# Patient Record
Sex: FEMALE | Race: WHITE | NOT HISPANIC OR LATINO | Employment: STUDENT | URBAN - NONMETROPOLITAN AREA
[De-identification: names, ages, dates, MRNs, and addresses within clinical notes are randomized per-mention and may not be internally consistent; named-entity substitution may affect disease eponyms.]

---

## 2023-07-12 PROBLEM — F41.8 SITUATIONAL ANXIETY: Status: ACTIVE | Noted: 2023-07-12

## 2023-07-12 PROBLEM — K44.9 HIATAL HERNIA: Status: ACTIVE | Noted: 2023-07-12

## 2023-07-12 PROBLEM — R68.89 COLD INTOLERANCE: Status: ACTIVE | Noted: 2023-07-12

## 2023-07-12 PROBLEM — L63.9 ALOPECIA AREATA: Status: ACTIVE | Noted: 2023-07-12

## 2023-07-12 RX ORDER — PANTOPRAZOLE SODIUM 20 MG/1
20 TABLET, DELAYED RELEASE ORAL
COMMUNITY

## 2023-07-12 RX ORDER — SUCRALFATE 1 G/1
1 TABLET ORAL
COMMUNITY

## 2023-07-12 RX ORDER — HYDROXYZINE HYDROCHLORIDE 25 MG/1
25 TABLET, FILM COATED ORAL
COMMUNITY
Start: 2022-05-19 | End: 2023-07-13 | Stop reason: WASHOUT

## 2023-07-12 NOTE — PROGRESS NOTES
"Subjective   Patient ID: Jazlyn Wahl is a 22 y.o. female who presents for Annual Exam (ABN given to pt and signed, pt verbalized understanding.).    HPI     TDAP: 5/2020  GARDASIL: completed  PAP: 6/2021 - managed by GYN, Dr. Tami Fuentes  MAMMO: N/A  CSCOPE: N/A  DEXA: N/A  HEP C SCREEN: none found  LIPID: 5/2022 TC/HDL ratio 2.5    Diet: balanced, overall healthy    Exercise: 3-4 times per week, lifts mostly but tries to walk every day, not doing any ROM or yoga.    Alcohol use:    Smoking: non-smoker    Dental visits up to date/overdue.  Vision screening up to date/overdue.    Breast cancer screening: Denies family history.   Denies lumps/bumps, skin changes, nipple retraction, or nipple drainage.    Cervical cancer screening: Managed by [facility]  Denies family history.   Denies pelvic pain, vaginal discharge, or vaginal bleeding.  Stopped OCP after getting off the Accutane.  Has seen GYN WITH abnormal pap smears in past.  Patient is sexually active, male partners, does not always use condoms.    Colon cancer screening: Denies family history.   Denies melena, hematochezia, constipation, diarrhea, bloating, change in bowel habits.    Cardiac disorder screening: Denies family history.  Denies chest pain, SOB, palpitations, edema, dizziness.     ACNE  Saw derm, [facility]  States got off Accutane just recently, took for 5 months.  Never had acne, developed only on back after wearing cotton shirts under outer shirts.    MOOD  Not using the hydroxyzine  States tried twice but could not tolerate due to sedation/hangover effect.    HIATAL HERNIA  Taking Protonix 20 mg daily.      Review of Systems   All other systems reviewed and are negative.      Objective   BP 96/61 (BP Location: Right arm, Patient Position: Sitting, BP Cuff Size: Small adult)   Pulse 82   Temp 36.6 °C (97.9 °F) (Temporal)   Resp 14   Ht 1.676 m (5' 6\")   Wt 70.3 kg (155 lb)   LMP 06/29/2023 (Approximate)   SpO2 95%   BMI 25.02 kg/m² "     Physical Exam  Vitals and nursing note reviewed.   Constitutional:       General: She is not in acute distress.     Appearance: Normal appearance. She is not toxic-appearing.   HENT:      Head: Normocephalic and atraumatic.      Right Ear: Tympanic membrane normal.      Left Ear: Tympanic membrane normal.      Nose: Nose normal.      Mouth/Throat:      Mouth: Mucous membranes are moist.   Eyes:      Extraocular Movements: Extraocular movements intact.      Pupils: Pupils are equal, round, and reactive to light.   Neck:      Thyroid: No thyromegaly.   Cardiovascular:      Rate and Rhythm: Normal rate and regular rhythm.      Heart sounds: No murmur heard.     No friction rub. No gallop.   Pulmonary:      Effort: Pulmonary effort is normal.      Breath sounds: Normal breath sounds. No wheezing, rhonchi or rales.   Abdominal:      General: Bowel sounds are normal. There is no distension.      Palpations: Abdomen is soft. There is no mass.      Tenderness: There is no abdominal tenderness. There is no guarding.   Musculoskeletal:      Right lower leg: No edema.      Left lower leg: No edema.   Lymphadenopathy:      Cervical: No cervical adenopathy.   Skin:     General: Skin is warm and dry.   Neurological:      General: No focal deficit present.      Mental Status: She is alert and oriented to person, place, and time.      Gait: Gait normal.      Deep Tendon Reflexes: Reflexes normal.   Psychiatric:         Mood and Affect: Mood normal.         Behavior: Behavior normal.         Assessment/Plan   Problem List Items Addressed This Visit       Hiatal hernia     Stable, continue Protonix 20 mg daily         Situational anxiety     Unable to tolerate hydroxyzine secondary to hangover effect  Managing well on her own at this time.  Can further address in the future if needed.         Healthcare maintenance - Primary     Vaccines and screenings reviewed, all are UTD.  Questionnaires completed.  Health and wellness topics  reviewed.  Diet and exercise recommendations revisited.  Routine blood work ordered today, deferred TSH as reports recently done with derm and WNL.  Paps managed by GYN, sees next month.  STI blood screening test deferred today, plans to further discuss with GYN.    Recommend vitamin D 9536-3520 IU daily.  Recommend some sort of exercise 5-6 times per week, 60 minutes those days.  Recommendation is visits with the dentist twice a year.   Make sure to brush teeth twice daily, and floss once a day.  Always wear a seatbelt when riding in cars, and tell the  no texting and driving.  Always wear sunscreen when you have sun exposure.  Annual eye exams are recommended.  Recommendation is no smoking of cigarettes, marijuana, or vaping.          Relevant Orders    CBC    Comprehensive Metabolic Panel    Lipid Panel    Acne     Developed acne to back, improved with 5-6 months of Accutane.  Doing well off the Accutane at this time.  Follow-up with derm as they have recommended.          Other Visit Diagnoses       Overweight with body mass index (BMI) of 25 to 25.9 in adult              Follow-up in 1 year for annual physical.   Call for sooner follow-up if needed.     Scribe Attestation  By signing my name below, ICharlene, Darnell   attest that this documentation has been prepared under the direction and in the presence of Genesis George DO.

## 2023-07-13 ENCOUNTER — OFFICE VISIT (OUTPATIENT)
Dept: PRIMARY CARE | Facility: CLINIC | Age: 23
End: 2023-07-13
Payer: COMMERCIAL

## 2023-07-13 VITALS
BODY MASS INDEX: 24.91 KG/M2 | HEART RATE: 82 BPM | WEIGHT: 155 LBS | TEMPERATURE: 97.9 F | OXYGEN SATURATION: 95 % | RESPIRATION RATE: 14 BRPM | SYSTOLIC BLOOD PRESSURE: 96 MMHG | DIASTOLIC BLOOD PRESSURE: 61 MMHG | HEIGHT: 66 IN

## 2023-07-13 DIAGNOSIS — L70.9 ACNE, UNSPECIFIED ACNE TYPE: ICD-10-CM

## 2023-07-13 DIAGNOSIS — K44.9 HIATAL HERNIA: ICD-10-CM

## 2023-07-13 DIAGNOSIS — E66.3 OVERWEIGHT WITH BODY MASS INDEX (BMI) OF 25 TO 25.9 IN ADULT: ICD-10-CM

## 2023-07-13 DIAGNOSIS — F41.8 SITUATIONAL ANXIETY: ICD-10-CM

## 2023-07-13 DIAGNOSIS — Z00.00 HEALTHCARE MAINTENANCE: Primary | ICD-10-CM

## 2023-07-13 PROBLEM — R68.89 COLD INTOLERANCE: Status: RESOLVED | Noted: 2023-07-12 | Resolved: 2023-07-13

## 2023-07-13 PROCEDURE — 1036F TOBACCO NON-USER: CPT | Performed by: FAMILY MEDICINE

## 2023-07-13 PROCEDURE — 99395 PREV VISIT EST AGE 18-39: CPT | Performed by: FAMILY MEDICINE

## 2023-07-13 PROCEDURE — 3008F BODY MASS INDEX DOCD: CPT | Performed by: FAMILY MEDICINE

## 2023-07-13 RX ORDER — DROSPIRENONE, ETHINYL ESTRADIOL AND LEVOMEFOLATE CALCIUM AND LEVOMEFOLATE CALCIUM 3-0.02(24)
1 KIT ORAL DAILY
COMMUNITY
Start: 2021-01-25

## 2023-07-13 NOTE — ASSESSMENT & PLAN NOTE
Unable to tolerate hydroxyzine secondary to hangover effect  Managing well on her own at this time.  Can further address in the future if needed.

## 2023-07-13 NOTE — ASSESSMENT & PLAN NOTE
Developed acne to back, improved with 5-6 months of Accutane.  Doing well off the Accutane at this time.  Follow-up with derm as they have recommended.

## 2023-07-13 NOTE — ASSESSMENT & PLAN NOTE
Vaccines and screenings reviewed, all are UTD.  Questionnaires completed.  Health and wellness topics reviewed.  Diet and exercise recommendations revisited.  Routine blood work ordered today, deferred TSH as reports recently done with derm and WNL.  Paps managed by GYN, sees next month.  STI blood screening test deferred today, plans to further discuss with GYN.    Recommend vitamin D 7827-9436 IU daily.  Recommend some sort of exercise 5-6 times per week, 60 minutes those days.  Recommendation is visits with the dentist twice a year.   Make sure to brush teeth twice daily, and floss once a day.  Always wear a seatbelt when riding in cars, and tell the  no texting and driving.  Always wear sunscreen when you have sun exposure.  Annual eye exams are recommended.  Recommendation is no smoking of cigarettes, marijuana, or vaping.

## 2023-09-29 ENCOUNTER — TELEPHONE (OUTPATIENT)
Dept: PRIMARY CARE | Facility: CLINIC | Age: 23
End: 2023-09-29
Payer: COMMERCIAL

## 2023-09-29 DIAGNOSIS — M25.531 CHRONIC WRIST PAIN, RIGHT: Primary | ICD-10-CM

## 2023-09-29 DIAGNOSIS — G89.29 CHRONIC WRIST PAIN, RIGHT: Primary | ICD-10-CM

## 2023-09-29 NOTE — TELEPHONE ENCOUNTER
Pt called for a referral for Occupational Therapy. Pt had a referral placed last year and it . Can you place a new referral. Pt would like to go next door. Once referral is placed pt would like a cb at [phone].

## 2023-10-02 NOTE — TELEPHONE ENCOUNTER
Right wrist pain last dec wants new ot order    no abdominal pain, no bloating, no constipation, no diarrhea, no nausea and no vomiting.

## 2023-10-10 ENCOUNTER — EVALUATION (OUTPATIENT)
Dept: OCCUPATIONAL THERAPY | Facility: CLINIC | Age: 23
End: 2023-10-10
Payer: COMMERCIAL

## 2023-10-10 DIAGNOSIS — G89.29 WRIST PAIN, CHRONIC, LEFT: Primary | ICD-10-CM

## 2023-10-10 DIAGNOSIS — M25.531 CHRONIC WRIST PAIN, RIGHT: ICD-10-CM

## 2023-10-10 DIAGNOSIS — G89.29 CHRONIC WRIST PAIN, RIGHT: ICD-10-CM

## 2023-10-10 DIAGNOSIS — M25.532 WRIST PAIN, CHRONIC, LEFT: Primary | ICD-10-CM

## 2023-10-10 PROCEDURE — 97165 OT EVAL LOW COMPLEX 30 MIN: CPT | Mod: GO

## 2023-10-10 PROCEDURE — 97110 THERAPEUTIC EXERCISES: CPT | Mod: GO

## 2023-10-10 PROCEDURE — 97035 APP MDLTY 1+ULTRASOUND EA 15: CPT | Mod: GO

## 2023-10-10 ASSESSMENT — ENCOUNTER SYMPTOMS
LOSS OF SENSATION IN FEET: 0
DEPRESSION: 0
OCCASIONAL FEELINGS OF UNSTEADINESS: 0

## 2023-10-10 ASSESSMENT — PAIN SCALES - GENERAL: PAINLEVEL_OUTOF10: 4

## 2023-10-10 ASSESSMENT — PAIN DESCRIPTION - DESCRIPTORS: DESCRIPTORS: ACHING;SHARP;SHOOTING

## 2023-10-10 ASSESSMENT — PAIN - FUNCTIONAL ASSESSMENT: PAIN_FUNCTIONAL_ASSESSMENT: 0-10

## 2023-10-10 NOTE — LETTER
October 10, 2023     Patient: Jazlyn Wahl   YOB: 2000   Date of Visit: 10/10/2023       To Whom it May Concern:    Jazlyn Wahl was seen in my clinic on 10/10/2023. She {Return to school/sport:08231}.    If you have any questions or concerns, please don't hesitate to call.         Sincerely,          Dave Scott, OT        CC: No Recipients

## 2023-10-10 NOTE — LETTER
October 10, 2023     Patient: Jazlyn Wahl   YOB: 2000   Date of Visit: 10/10/2023       To Whom It May Concern:    It is my medical opinion that Jazlyn Wahl {Work release (duty restriction):16533}.    If you have any questions or concerns, please don't hesitate to call.         Sincerely,        Dave Scott, OT    CC: No Recipients

## 2023-10-10 NOTE — PROGRESS NOTES
Occupational Therapy Evaluation    Patient Name: Jazlyn Wahl  MRN: [id number]  Today's Date: 10/10/2023  Time Calculation  Start Time: 1230  Stop Time: 1315  Time Calculation (min): 45 min    Subjective   Current Problem:  Pt been having pain ulnar two digits dorsal side.  However, after CPR class it just got way worse.  Pain:  Pain Assessment  Pain Assessment: 0-10  Pain Score: 4  Pain Type: Chronic pain  Pain Location: Hand  Pain Orientation: Left  Pain Radiating Towards: dorsal ulnar two digits  Pain Descriptors: Aching, Sharp, Shooting  Pain Frequency: Constant/continuous  Clinical Progression: Not changed  Effect of Pain on Daily Activities: Work, and gym    Objective   Pt reports pain with her job as a  and when working out at gym.  General Assessments:  Hand Function  Gross Grasp: Functional (R  64#  L  70#)  Extremity Assessments:  RUE   RUE : Exceptions to WFL  RUE AROM (degrees)  R Forearm Pronation  0-80-90:  (WNL)  R Forearm Supination  0-80-90:  (WNL)  R Wrist Flexion 0-80: 65 Degrees  R Wrist Extension 0-70: 70 Degrees  R Wrist Radial Deviation 0-20: 25 Degrees  R Wrist Ulnar Deviation 0-30: 35 Degrees  RUE Strength  RUE Overall Strength: Within Functional Limits - strength 5/5  R Wrist Flexion: 4+/5  R Wrist Extension: 4+/5  R Wrist Radial Deviation: 5/5  R Wrist Ulnar Deviation: 5/5    TREATMENT  Therapeutic Exercise  Therapeutic Exercise Performed: Yes  Therapeutic Exercise Activity 1: Initiated HEP (Wrist isometrics including ext/flex, RD/UD x 10 reps)  Therapeutic Exercise Activity 2: Kinesiotape (Applied kinesiotape mechanically for wrist and space correction over dorsal wrist.) Therapeutic Exercise  Therapeutic Exercise Performed: Yes  Therapeutic Exercise Activity 1: Initiated HEP (Wrist isometrics including ext/flex, RD/UD x 10 reps)  Therapeutic Exercise Activity 2: Kinesiotape (Applied kinesiotape mechanically for wrist and space correction over dorsal wrist.)   Other  Activity  Other Activity Performed: Yes  Other Activity 1: Ultrasound 0.8 w/cm2, 3 mhz, 50% to dorsal ulnar two digits   Assessment/Plan   OT Assessment  OT Assessment Results: Decreased endurance, Other (Comment) (pain is most prominant issue)  Strengths: Attitude of self, Ability to acquire knowledge  Plan  OT Plan: TE, TA, US, Manual  Duration: 6 weeks    Encounter Problems       Encounter Problems (Active)       OT Goals       Pt will report pain on ulnar side dorsally less than 2/10 with work duties and gym workout.       Start:  10/10/23    Expected End:  11/21/23            Pt will report less constant pain 1/10 with ADLS/IADLS.       Start:  10/10/23    Expected End:  11/21/23            Pt will be independent with HEP carryover in order to reduce pain.       Start:  10/10/23    Expected End:  11/21/23

## 2023-10-17 ENCOUNTER — TREATMENT (OUTPATIENT)
Dept: OCCUPATIONAL THERAPY | Facility: CLINIC | Age: 23
End: 2023-10-17
Payer: COMMERCIAL

## 2023-10-17 DIAGNOSIS — M25.531 CHRONIC WRIST PAIN, RIGHT: ICD-10-CM

## 2023-10-17 DIAGNOSIS — G89.29 CHRONIC WRIST PAIN, RIGHT: ICD-10-CM

## 2023-10-17 PROCEDURE — 97035 APP MDLTY 1+ULTRASOUND EA 15: CPT | Mod: GO

## 2023-10-17 PROCEDURE — 97110 THERAPEUTIC EXERCISES: CPT | Mod: GO

## 2023-10-17 PROCEDURE — 97022 WHIRLPOOL THERAPY: CPT | Mod: GO

## 2023-10-17 ASSESSMENT — PAIN SCALES - GENERAL: PAINLEVEL_OUTOF10: 4

## 2023-10-17 ASSESSMENT — PAIN - FUNCTIONAL ASSESSMENT: PAIN_FUNCTIONAL_ASSESSMENT: 0-10

## 2023-10-17 NOTE — PROGRESS NOTES
Occupational Therapy Treatment    Patient Name: Jazlyn Wahl  MRN: [id number]  Today's Date: 10/17/2023  Time Calculation  Start Time: 1315  Stop Time: 1415  Time Calculation (min): 60 min    Subjective   Current Problem:  Pt states she had a lot of pain after last treatment.  She had to take the tape off the next day.   Pain:  Pain Assessment  Pain Assessment: 0-10  Pain Score: 4  Pain Location: Hand  Pain Orientation: Left    Objective        Treatment:  Therapeutic Exercise  Therapeutic Exercise Performed: Yes  Therapeutic Exercise Activity 1: HEP (tendon glides)  Therapeutic Exercise Activity 2: HEP (wrist ROM ext/flex, RD/UD in a safe position)         Other Activity  Other Activity Performed: Yes  Other Activity 1: Fluidotherapy with AROM of R hand  Other Activity 2: Ultrasound 0.8 w/cm2, 50% to dorsum of hand and ulnar volar side of hand (6/4 minutes each)  Other Activity 3: Issued prefab wrist wrap (Wear/care instructions provided.)    Assessment/Plan Pt continues to have pain in her hand dorsal and volar aspect. All TE and education provided to help reduce the pain.     Plan  OT Plan: TE, TA, US, ManualGOALS:  Active       OT Goals       Pt will report pain on ulnar side dorsally less than 2/10 with work duties and gym workout.       Start:  10/10/23    Expected End:  11/21/23            Pt will report less constant pain 1/10 with ADLS/IADLS.       Start:  10/10/23    Expected End:  11/21/23            Pt will be independent with HEP carryover in order to reduce pain.       Start:  10/10/23    Expected End:  11/21/23

## 2023-10-24 ENCOUNTER — TREATMENT (OUTPATIENT)
Dept: OCCUPATIONAL THERAPY | Facility: CLINIC | Age: 23
End: 2023-10-24
Payer: COMMERCIAL

## 2023-10-24 DIAGNOSIS — G89.29 CHRONIC WRIST PAIN, RIGHT: ICD-10-CM

## 2023-10-24 DIAGNOSIS — M25.531 CHRONIC WRIST PAIN, RIGHT: ICD-10-CM

## 2023-10-24 PROCEDURE — 97022 WHIRLPOOL THERAPY: CPT | Mod: GO

## 2023-10-24 PROCEDURE — 97140 MANUAL THERAPY 1/> REGIONS: CPT | Mod: GO

## 2023-10-24 PROCEDURE — 97110 THERAPEUTIC EXERCISES: CPT | Mod: GO

## 2023-10-24 PROCEDURE — 97035 APP MDLTY 1+ULTRASOUND EA 15: CPT | Mod: GO

## 2023-10-24 ASSESSMENT — PAIN - FUNCTIONAL ASSESSMENT: PAIN_FUNCTIONAL_ASSESSMENT: 0-10

## 2023-10-24 ASSESSMENT — PAIN SCALES - GENERAL: PAINLEVEL_OUTOF10: 1

## 2023-10-24 NOTE — PROGRESS NOTES
"OOccupational Therapy Treatment    Patient Name: Jazlyn Wahl  MRN: [id number]  Today's Date: 10/24/2023  Time Calculation  Start Time: 1700  Stop Time: 1750  Time Calculation (min): 50 min    Subjective   Current Problem:  Pt arrives stating she literally has no pain and cannot believe it. \"I haven't been pain free for 2 years.\"  Pain:  Pain Assessment  Pain Assessment: 0-10  Pain Score: 1  Pain Location: Hand  Pain Orientation: Left    Objective   Treatment:  Therapeutic Exercise  Therapeutic Exercise Performed: Yes  Therapeutic Exercise Activity 1: Upgraded HEP with use of pink sponge doing tendon glides. (Advised pt to go slow with this exercise.)  Therapeutic Exercise Activity 2: Reviewed regular tendon glides that need to continue.  Therapeutic Exercise Activity 3: Encouraged diaphgramatic breathing throughout the day.      Manual Therapy  Manual Therapy Performed: Yes  Manual Therapy Activity 1: STM hand dorsal and volar as well as down wrist and forearm. Retrograde massage as well. (Educated pt on lymph system and how it works with the retrograde massage.)    Other Activity  Other Activity Performed: Yes  Other Activity 1: Fluidotherapy with AROM of R hand  Other Activity 2: Ultrasound 0.8 w/cm2, 50% to dorsum of hand and ulnar volar side of hand (6/4 minutes each)      Assessment/Plan Pt making good progress with reduction in pain. Strongly recommended pt to go slow with use of pink sponge.     Plan  OT Plan: TE, TA, US, Manual    GOALS:  Active       OT Goals       Pt will report pain on ulnar side dorsally less than 2/10 with work duties and gym workout.       Start:  10/10/23    Expected End:  11/21/23            Pt will report less constant pain 1/10 with ADLS/IADLS.       Start:  10/10/23    Expected End:  11/21/23            Pt will be independent with HEP carryover in order to reduce pain.       Start:  10/10/23    Expected End:  11/21/23              "

## 2023-10-31 ENCOUNTER — APPOINTMENT (OUTPATIENT)
Dept: OCCUPATIONAL THERAPY | Facility: CLINIC | Age: 23
End: 2023-10-31
Payer: COMMERCIAL

## 2023-11-07 ENCOUNTER — TREATMENT (OUTPATIENT)
Dept: OCCUPATIONAL THERAPY | Facility: CLINIC | Age: 23
End: 2023-11-07
Payer: COMMERCIAL

## 2023-11-07 DIAGNOSIS — M25.531 CHRONIC WRIST PAIN, RIGHT: ICD-10-CM

## 2023-11-07 DIAGNOSIS — G89.29 CHRONIC WRIST PAIN, RIGHT: ICD-10-CM

## 2023-11-07 PROCEDURE — 97110 THERAPEUTIC EXERCISES: CPT | Mod: GO

## 2023-11-07 PROCEDURE — 97035 APP MDLTY 1+ULTRASOUND EA 15: CPT | Mod: GO

## 2023-11-07 PROCEDURE — 97140 MANUAL THERAPY 1/> REGIONS: CPT | Mod: GO

## 2023-11-07 ASSESSMENT — PAIN SCALES - GENERAL: PAINLEVEL_OUTOF10: 3

## 2023-11-07 ASSESSMENT — PAIN - FUNCTIONAL ASSESSMENT: PAIN_FUNCTIONAL_ASSESSMENT: 0-10

## 2023-11-07 ASSESSMENT — PAIN DESCRIPTION - DESCRIPTORS: DESCRIPTORS: OTHER (COMMENT)

## 2023-11-07 NOTE — PROGRESS NOTES
Occupational Therapy Treatment    Patient Name: Jazlyn Wahl  MRN: [id number]  Today's Date: 11/7/2023  Time Calculation  Start Time: 1325  Stop Time: 1415  Time Calculation (min): 50 min    Subjective   Current Problem:  Pt states she was weight lifting and hurt her shoulder and then doing push ups and hurt her wrist.  All on right side.   Pain:  Pain Assessment  Pain Assessment: 0-10  Pain Score: 3  Pain Location: Hand  Pain Orientation: Left  Pain Descriptors: Other (Comment) (Stiff)  Clinical Progression: Other (Comment) (Pt attempted push up and hurt her wrist and then press up hurt her shoulder)    Objective   Splinting and Casting: Encouraged pt to wear neoprene wrist wrap daily.     Treatment:  Therapeutic Exercise  Therapeutic Exercise Performed: Yes  Therapeutic Exercise Activity 1: Upgraded HEP with wrist ext/flex place holds.  Therapeutic Exercise Activity 2: Upgraded HEP with doorway stretch and pec stretch both to hold 10 seconds and complete 3 reps 3 times per day.       Manual Therapy  Manual Therapy Performed: Yes  Manual Therapy Activity 1: STM hand dorsal and volar as well as down wrist and forearm. Retrograde massage as well.  Manual Therapy Activity 2: IASTM with hawk  on R shoulder anterior, middle and posterior. (Good histamine responses)  Manual Therapy Activity 3: Applied kinesiotape to dorsum of wrist using space correction in a cross pattern to help reduce pain.  Manual Therapy Activity 4: Applied kinesiotape to shoulder using an X to inhibit biceps from elbow to shoulder.    Other Activity  Other Activity Performed: Yes  Other Activity 1: Fluidotherapy with AROM of R hand  Other Activity 2: Ultrasound 0.8 w/cm2, 50% to dorsum of hand and ulnar volar side of hand (6/4 minutes each)      Assessment/Plan Pt responded well to treatment.  Strongly recommended to pt to discontinue weight lifting with upper body for at least a week. Will revisit her pain and injuries next week.  Pt did state  the tape placed in both areas seem to be helping.     Plan  OT Plan: TE, TA, US, Manual    GOALS:  Active       OT Goals       Pt will report pain on ulnar side dorsally less than 2/10 with work duties and gym workout.       Start:  10/10/23    Expected End:  11/21/23            Pt will report less constant pain 1/10 with ADLS/IADLS.       Start:  10/10/23    Expected End:  11/21/23            Pt will be independent with HEP carryover in order to reduce pain.       Start:  10/10/23    Expected End:  11/21/23

## 2023-11-14 ENCOUNTER — TREATMENT (OUTPATIENT)
Dept: OCCUPATIONAL THERAPY | Facility: CLINIC | Age: 23
End: 2023-11-14
Payer: COMMERCIAL

## 2023-11-14 DIAGNOSIS — G89.29 CHRONIC WRIST PAIN, RIGHT: ICD-10-CM

## 2023-11-14 DIAGNOSIS — M25.531 CHRONIC WRIST PAIN, RIGHT: ICD-10-CM

## 2023-11-14 PROCEDURE — 97140 MANUAL THERAPY 1/> REGIONS: CPT | Mod: GO

## 2023-11-14 PROCEDURE — 97035 APP MDLTY 1+ULTRASOUND EA 15: CPT | Mod: GO

## 2023-11-14 PROCEDURE — 97022 WHIRLPOOL THERAPY: CPT | Mod: GO

## 2023-11-14 PROCEDURE — 97110 THERAPEUTIC EXERCISES: CPT | Mod: GO

## 2023-11-14 ASSESSMENT — PAIN SCALES - GENERAL: PAINLEVEL_OUTOF10: 3

## 2023-11-14 ASSESSMENT — PAIN - FUNCTIONAL ASSESSMENT: PAIN_FUNCTIONAL_ASSESSMENT: 0-10

## 2023-11-14 NOTE — PROGRESS NOTES
Occupational Therapy Treatment    Patient Name: Jazlyn Wahl  MRN: [id number]  Today's Date: 11/14/2023  Time Calculation  Start Time: 1105  Stop Time: 1155  Time Calculation (min): 50 min    Subjective   Current Problem:  Pt arrives to clinic stating feels like top of hand is in a bubble.  Also shoulder much better like a 2/10.   Pain:  Pain Assessment  Pain Assessment: 0-10  Pain Score: 3  Pain Location: Hand  Pain Orientation: Left  Clinical Progression: Gradually improving  Effect of Pain on Daily Activities: Pt reports shoulder improving.    Objective      Treatment:  Therapeutic Exercise  Therapeutic Exercise Performed: Yes  Therapeutic Exercise Activity 1: Upgraded HEP with theraputty including full and flat , 3 jaw and lateral pinch and finger extension.  Issued yellow putty.    Manual Therapy  Manual Therapy Performed: Yes  Manual Therapy Activity 1: Applied kinesiotape to dorsum of wrist using space correction in a cross pattern to help reduce pain.    Other Activity  Other Activity Performed: Yes  Other Activity 1: Fluidotherapy with AROM of R hand  Other Activity 2: Ultrasound 3 mHz, 0.8 w/cm2, 50% to dorsum of hand and wrist.      Assessment/Plan Pt is able to work through her pain/tightness in her hand stating it is not bad enough to stop.  Advised pt that if this continues we may need to consider an orthopedic doctor referral.     Plan  OT Plan: TE, TA, US, Manual    GOALS:  Active       OT Goals       Pt will report pain on ulnar side dorsally less than 2/10 with work duties and gym workout.       Start:  10/10/23    Expected End:  11/21/23            Pt will report less constant pain 1/10 with ADLS/IADLS.       Start:  10/10/23    Expected End:  11/21/23            Pt will be independent with HEP carryover in order to reduce pain.       Start:  10/10/23    Expected End:  11/21/23

## 2023-11-21 ENCOUNTER — TREATMENT (OUTPATIENT)
Dept: OCCUPATIONAL THERAPY | Facility: CLINIC | Age: 23
End: 2023-11-21
Payer: COMMERCIAL

## 2023-11-21 DIAGNOSIS — G89.29 CHRONIC WRIST PAIN, RIGHT: ICD-10-CM

## 2023-11-21 DIAGNOSIS — M25.531 CHRONIC WRIST PAIN, RIGHT: ICD-10-CM

## 2023-11-21 PROCEDURE — 97110 THERAPEUTIC EXERCISES: CPT | Mod: GO

## 2023-11-21 PROCEDURE — 97035 APP MDLTY 1+ULTRASOUND EA 15: CPT | Mod: GO

## 2023-11-21 PROCEDURE — 97022 WHIRLPOOL THERAPY: CPT | Mod: GO

## 2023-11-21 ASSESSMENT — PAIN - FUNCTIONAL ASSESSMENT: PAIN_FUNCTIONAL_ASSESSMENT: 0-10

## 2023-11-21 ASSESSMENT — PAIN SCALES - GENERAL: PAINLEVEL_OUTOF10: 3

## 2023-11-21 NOTE — PROGRESS NOTES
Occupational Therapy Treatment    Patient Name: Jazlyn Wahl  MRN: [id number]  Today's Date: 11/21/2023  Time Calculation  Start Time: 1105  Stop Time: 1200  Time Calculation (min): 55 min    Subjective   Current Problem:  Pt continues to c/o dorsal pain of R wrist.   Pain:  Pain Assessment  Pain Assessment: 0-10  Pain Score: 3  Pain Location: Hand  Pain Orientation: Left  Clinical Progression: Other (Comment) (Pt seems to be waxing and waning with pain)    Objective      Treatment:  Therapeutic Exercise  Therapeutic Exercise Performed: Yes  Therapeutic Exercise Activity 1: Upgraded HEP with theraband for R wrist ext/flex, RD/UD and forearm sup/pro.  Issued red band.  Therapeutic Exercise Activity 2: Reviewed wrist isometrics including ext/flex, RD/UD that pt should continue with as well.      Manual Therapy  Manual Therapy Performed: Yes  Manual Therapy Activity 1: Applied kinesiotape to R wrist using mechanical approach along with space correction over dorsal wrist retinacular band.    Other Activity  Other Activity Performed: Yes  Other Activity 1: Fluidotherapy with AROM of R hand  Other Activity 2: Ultrasound 3 mHz, 0.8 w/cm2, 50% to dorsum of hand and wrist.  I    Assessment/Plan Pt continues to have dorsal wrist pain despite ultrasound, kinesiotaping and TE. Pt is seeing a physician next week for shoulder and recommended she say something about her wrist.  Wrote an order for 1 time per week for 4 more weeks for therapy.    GOALS:  Active       OT Goals       Pt will report pain on ulnar side dorsally less than 2/10 with work duties and gym workout.       Start:  10/10/23    Expected End:  11/21/23            Pt will report less constant pain 1/10 with ADLS/IADLS.       Start:  10/10/23    Expected End:  11/21/23            Pt will be independent with HEP carryover in order to reduce pain.       Start:  10/10/23    Expected End:  11/21/23

## 2023-12-05 ENCOUNTER — DOCUMENTATION (OUTPATIENT)
Dept: OCCUPATIONAL THERAPY | Facility: CLINIC | Age: 23
End: 2023-12-05
Payer: COMMERCIAL

## 2023-12-05 NOTE — PROGRESS NOTES
Occupational Therapy                 Therapy Communication Note    Patient Name: Jazlyn Wahl  MRN: [id number]  Today's Date: 12/5/2023     Discipline: Occupational Therapy    Missed Visit Reason:      Missed Time: No Show    Comment: Called and left message for pt regarding appointment date and time.

## 2023-12-12 ENCOUNTER — APPOINTMENT (OUTPATIENT)
Dept: OCCUPATIONAL THERAPY | Facility: CLINIC | Age: 23
End: 2023-12-12
Payer: COMMERCIAL

## 2023-12-26 ENCOUNTER — APPOINTMENT (OUTPATIENT)
Dept: OCCUPATIONAL THERAPY | Facility: CLINIC | Age: 23
End: 2023-12-26
Payer: COMMERCIAL

## 2024-04-11 ENCOUNTER — DOCUMENTATION (OUTPATIENT)
Dept: OCCUPATIONAL THERAPY | Facility: CLINIC | Age: 24
End: 2024-04-11
Payer: COMMERCIAL

## 2024-04-11 NOTE — PROGRESS NOTES
Occupational Therapy    Discharge Summary    Name: Jazlyn Wahl  MRN: [id number]  : 2000  Date: 22    Discharge Summary: OT    Discharge Information: Date of discharge 24, Date of last visit 23, Date of evaluation 10/10/23, Number of attended visits 6, Referred by Dr. George, and Referred for Hand and wrist pain    Therapy Summary: Pt did well with rehab increasing her strength and reducing pain.  However, pt did not return for reassessment.    Discharge Status: Discharged OT     Rehab Discharge Reason: Achieved all and/or the most significant goals(s) and Failed to schedule and/or keep follow-up appointment(s)

## 2024-07-15 ENCOUNTER — APPOINTMENT (OUTPATIENT)
Dept: PRIMARY CARE | Facility: CLINIC | Age: 24
End: 2024-07-15
Payer: COMMERCIAL

## 2024-07-19 ENCOUNTER — APPOINTMENT (OUTPATIENT)
Dept: PRIMARY CARE | Facility: CLINIC | Age: 24
End: 2024-07-19
Payer: COMMERCIAL

## 2024-07-23 ENCOUNTER — APPOINTMENT (OUTPATIENT)
Dept: PRIMARY CARE | Facility: CLINIC | Age: 24
End: 2024-07-23
Payer: COMMERCIAL

## 2024-07-23 ENCOUNTER — LAB (OUTPATIENT)
Dept: LAB | Facility: LAB | Age: 24
End: 2024-07-23
Payer: COMMERCIAL

## 2024-07-23 VITALS
BODY MASS INDEX: 23.01 KG/M2 | RESPIRATION RATE: 16 BRPM | WEIGHT: 146.6 LBS | OXYGEN SATURATION: 96 % | SYSTOLIC BLOOD PRESSURE: 96 MMHG | HEART RATE: 90 BPM | DIASTOLIC BLOOD PRESSURE: 62 MMHG | TEMPERATURE: 98.5 F | HEIGHT: 67 IN

## 2024-07-23 DIAGNOSIS — R53.83 FATIGUE, UNSPECIFIED TYPE: ICD-10-CM

## 2024-07-23 DIAGNOSIS — Z83.49 FAMILY HISTORY OF THYROID DISEASE: ICD-10-CM

## 2024-07-23 DIAGNOSIS — Z00.00 HEALTHCARE MAINTENANCE: Primary | ICD-10-CM

## 2024-07-23 DIAGNOSIS — Z00.00 HEALTHCARE MAINTENANCE: ICD-10-CM

## 2024-07-23 DIAGNOSIS — Z11.3 ROUTINE SCREENING FOR STI (SEXUALLY TRANSMITTED INFECTION): ICD-10-CM

## 2024-07-23 PROBLEM — R87.612 LOW GRADE SQUAMOUS INTRAEPITHELIAL LESION ON CYTOLOGIC SMEAR OF CERVIX (LGSIL): Status: RESOLVED | Noted: 2019-05-21 | Resolved: 2024-07-23

## 2024-07-23 PROBLEM — K21.9 GASTROESOPHAGEAL REFLUX DISEASE: Status: ACTIVE | Noted: 2017-07-18

## 2024-07-23 PROCEDURE — 86780 TREPONEMA PALLIDUM: CPT

## 2024-07-23 PROCEDURE — 99395 PREV VISIT EST AGE 18-39: CPT | Performed by: FAMILY MEDICINE

## 2024-07-23 PROCEDURE — 80061 LIPID PANEL: CPT

## 2024-07-23 PROCEDURE — 85027 COMPLETE CBC AUTOMATED: CPT

## 2024-07-23 PROCEDURE — 1036F TOBACCO NON-USER: CPT | Performed by: FAMILY MEDICINE

## 2024-07-23 PROCEDURE — 36415 COLL VENOUS BLD VENIPUNCTURE: CPT

## 2024-07-23 PROCEDURE — 84443 ASSAY THYROID STIM HORMONE: CPT

## 2024-07-23 PROCEDURE — 3008F BODY MASS INDEX DOCD: CPT | Performed by: FAMILY MEDICINE

## 2024-07-23 PROCEDURE — 87389 HIV-1 AG W/HIV-1&-2 AB AG IA: CPT

## 2024-07-23 PROCEDURE — 87491 CHLMYD TRACH DNA AMP PROBE: CPT

## 2024-07-23 PROCEDURE — 80053 COMPREHEN METABOLIC PANEL: CPT

## 2024-07-23 PROCEDURE — 87591 N.GONORRHOEAE DNA AMP PROB: CPT

## 2024-07-23 ASSESSMENT — LIFESTYLE VARIABLES: HOW OFTEN DO YOU HAVE A DRINK CONTAINING ALCOHOL: 2-4 TIMES A MONTH

## 2024-07-23 NOTE — PROGRESS NOTES
Subjective   Patient ID: Jazlyn Wahl is a 23 y.o. female who presents for Annual Exam (Pt presents for annual physical exam- ABN was given to pt and signed, pt verbalized understanding. Pt would like some updated blood work. ).    HPI     Patient presents today for annual physical.  Patient concerns:    #Update labs    # s/p D&C, states GYN reported patient not healing as expected  Has been hard for her mentally as expected with loss, feels she is getting better.  Started back in counseling, helping.  Also started back riding horses, boarding her horse locally  Patient is currently / at [place].    #s/p shoulder surgery for impingement 1 year ago  Healing well      WELLNESS VISIT   TDAP: 2020  GARDASIL: completed  PAP: 2023 - managed by GYN  MAMMO: N/A  CSCOPE: N/A  DEXA: N/A  HEP C SCREEN: 2021 via GYN  LIPID: 2022 TC/HDL ratio 2.5      Diet: balanced  Exercise: frequent - rides bike for work, outside a lot  Alcohol use: uses socially  Smoking: never smoker    Cervical cancer screening: utd via GYN  STI testing requested today  4 partners in last year  LMP 2024    Breast cancer screening: n/a  Denies family history in first degree relative.  Denies lumps/bumps, skin changes, nipple retraction, or nipple drainage.    Colon cancer screening: n/a  Denies family history in first degree relative.  Denies melena, hematochezia, constipation, diarrhea, bloating, change in bowel habits.    Thyroid disorder screenin siblings with thyroid disease, both older sister  + fatigue but denies heat or cold intolerance, diarrhea or constipation, coarsening of hair, and palpitations.     Cardiac disorder screening:   Denies family history in first degree relative.  Denies chest pain, SOB, palpitations, edema, dizziness.     Review of Systems   All other systems reviewed and are negative.      Objective   BP 96/62 (BP Location: Left arm, Patient Position: Sitting, BP Cuff Size: Adult)    "Pulse 90   Temp 36.9 °C (98.5 °F) (Temporal)   Resp 16   Ht 1.689 m (5' 6.5\")   Wt 66.5 kg (146 lb 9.6 oz)   SpO2 96%   BMI 23.31 kg/m²     Physical Exam  Vitals and nursing note reviewed.   Constitutional:       General: She is not in acute distress.     Appearance: Normal appearance. She is not toxic-appearing.   HENT:      Head: Normocephalic and atraumatic.      Right Ear: Tympanic membrane normal.      Left Ear: Tympanic membrane normal.      Nose: Nose normal.      Mouth/Throat:      Mouth: Mucous membranes are moist.   Eyes:      Extraocular Movements: Extraocular movements intact.      Pupils: Pupils are equal, round, and reactive to light.   Neck:      Thyroid: No thyromegaly.   Cardiovascular:      Rate and Rhythm: Normal rate and regular rhythm.      Heart sounds: No murmur heard.     No friction rub. No gallop.   Pulmonary:      Effort: Pulmonary effort is normal.      Breath sounds: Normal breath sounds. No wheezing, rhonchi or rales.   Abdominal:      General: Bowel sounds are normal. There is no distension.      Palpations: Abdomen is soft. There is no mass.      Tenderness: There is no abdominal tenderness. There is no guarding.   Musculoskeletal:      Right lower leg: No edema.      Left lower leg: No edema.   Lymphadenopathy:      Cervical: No cervical adenopathy.   Skin:     General: Skin is warm and dry.   Neurological:      General: No focal deficit present.      Mental Status: She is alert and oriented to person, place, and time.      Gait: Gait normal.      Deep Tendon Reflexes: Reflexes normal.   Psychiatric:         Mood and Affect: Mood normal.         Behavior: Behavior normal.         Assessment/Plan   Problem List Items Addressed This Visit             ICD-10-CM    Healthcare maintenance - Primary Z00.00     Vaccines and screenings reviewed.  Questionnaires completed.  Health and wellness topics reviewed.  Diet and exercise recommendations revisited.  Routine blood work ordered " today.     VACCINES:  -TDAP is good until 2030  -Gardasil vaccine previously completed, good for lifetime.    SCREENINGS:  -Screening pap is utd via GYN  -Screening mammogram is recommended starting at age 40  -Screening for colon cancer is recommended starting at age 45    LIFESTYLE MEASURES  -consider increasing protein intake provided no issues with kidneys to 1 gram per 1 pound of ideal body weight per not to exceed 150 gram per day. May have to supplement with a protein powder to achieve this goal.  -make sure you are avoiding refined carbs such as breads, pasta, cereal, candy, soda,  nutrition bars, granola, chips, and sugar sweetened beverages.      -eat 5- 7 servings daily of veggies,  healthy protein such as chicken, fish,  beans, and eggs, and include healthy fats in your diet such as seeds, nuts, olive oil, avocados, and salmon.   -exercise 4 - 6 days per week as you are able, 150 minutes total weekly divided up is recommended with 3-4 of those days including resistance/strength training.  -Vitamin D is recommended at 1000 - 5000 IU international units daily.   -Always wear sunscreen when you have sun exposure.  -64 oz of water is recommended daily.  -Dental visits recommended every 6 months.  -Eye exam recommended every 2 years, for those with vision problems every year.           Relevant Orders    CBC    Comprehensive Metabolic Panel    Lipid Panel     Other Visit Diagnoses         Codes    Routine screening for STI (sexually transmitted infection)     Z11.3    Relevant Orders    HIV 1/2 Antigen/Antibody Screen with Reflex to Confirmation    Syphilis Screen with Reflex    C. Trachomatis / N. Gonorrhoeae, Amplified Detection    Family history of thyroid disease     Z83.49    Fatigue, unspecified type     R53.83    Body mass index (BMI) of 23.0 to 23.9 in adult     Z68.23          Follow-up in 1 year for annual physical.   Call for sooner follow-up if needed.       Scribe Attestation  By signing my name  below, I, Darnell Soliman   attest that this documentation has been prepared under the direction and in the presence of Genesis George DO.

## 2024-07-24 LAB
ALBUMIN SERPL BCP-MCNC: 4.9 G/DL (ref 3.4–5)
ALP SERPL-CCNC: 39 U/L (ref 33–110)
ALT SERPL W P-5'-P-CCNC: 13 U/L (ref 7–45)
ANION GAP SERPL CALC-SCNC: 14 MMOL/L (ref 10–20)
AST SERPL W P-5'-P-CCNC: 11 U/L (ref 9–39)
BILIRUB SERPL-MCNC: 0.6 MG/DL (ref 0–1.2)
BUN SERPL-MCNC: 14 MG/DL (ref 6–23)
C TRACH RRNA SPEC QL NAA+PROBE: NEGATIVE
CALCIUM SERPL-MCNC: 10 MG/DL (ref 8.6–10.6)
CHLORIDE SERPL-SCNC: 102 MMOL/L (ref 98–107)
CHOLEST SERPL-MCNC: 159 MG/DL (ref 0–199)
CHOLESTEROL/HDL RATIO: 2.4
CO2 SERPL-SCNC: 27 MMOL/L (ref 21–32)
CREAT SERPL-MCNC: 0.71 MG/DL (ref 0.5–1.05)
EGFRCR SERPLBLD CKD-EPI 2021: >90 ML/MIN/1.73M*2
ERYTHROCYTE [DISTWIDTH] IN BLOOD BY AUTOMATED COUNT: 16 % (ref 11.5–14.5)
GLUCOSE SERPL-MCNC: 82 MG/DL (ref 74–99)
HCT VFR BLD AUTO: 40.5 % (ref 36–46)
HDLC SERPL-MCNC: 65.6 MG/DL
HGB BLD-MCNC: 12.5 G/DL (ref 12–16)
HIV 1+2 AB+HIV1 P24 AG SERPL QL IA: NONREACTIVE
LDLC SERPL CALC-MCNC: 85 MG/DL
MCH RBC QN AUTO: 25.7 PG (ref 26–34)
MCHC RBC AUTO-ENTMCNC: 30.9 G/DL (ref 32–36)
MCV RBC AUTO: 83 FL (ref 80–100)
N GONORRHOEA DNA SPEC QL PROBE+SIG AMP: NEGATIVE
NON HDL CHOLESTEROL: 93 MG/DL (ref 0–149)
NRBC BLD-RTO: 0 /100 WBCS (ref 0–0)
PLATELET # BLD AUTO: 270 X10*3/UL (ref 150–450)
POTASSIUM SERPL-SCNC: 4.3 MMOL/L (ref 3.5–5.3)
PROT SERPL-MCNC: 7.4 G/DL (ref 6.4–8.2)
RBC # BLD AUTO: 4.86 X10*6/UL (ref 4–5.2)
SODIUM SERPL-SCNC: 139 MMOL/L (ref 136–145)
TREPONEMA PALLIDUM IGG+IGM AB [PRESENCE] IN SERUM OR PLASMA BY IMMUNOASSAY: NONREACTIVE
TRIGL SERPL-MCNC: 42 MG/DL (ref 0–149)
TSH SERPL-ACNC: 1.43 MIU/L (ref 0.44–3.98)
VLDL: 8 MG/DL (ref 0–40)
WBC # BLD AUTO: 8.1 X10*3/UL (ref 4.4–11.3)

## 2024-11-30 DIAGNOSIS — R14.0 ABDOMINAL BLOATING: ICD-10-CM

## 2024-11-30 DIAGNOSIS — M19.90 ARTHRITIS: Primary | ICD-10-CM

## 2024-12-02 ENCOUNTER — LAB (OUTPATIENT)
Dept: LAB | Facility: LAB | Age: 24
End: 2024-12-02
Payer: COMMERCIAL

## 2024-12-02 DIAGNOSIS — M19.90 ARTHRITIS: ICD-10-CM

## 2024-12-02 DIAGNOSIS — R14.0 ABDOMINAL BLOATING: ICD-10-CM

## 2024-12-02 PROCEDURE — 36415 COLL VENOUS BLD VENIPUNCTURE: CPT

## 2024-12-03 LAB
CRP SERPL-MCNC: <0.1 MG/DL
ERYTHROCYTE [SEDIMENTATION RATE] IN BLOOD BY WESTERGREN METHOD: 3 MM/H (ref 0–20)
IGA SERPL-MCNC: 211 MG/DL (ref 70–400)
RHEUMATOID FACT SER NEPH-ACNC: <10 IU/ML (ref 0–15)
TTG IGA SER IA-ACNC: <1 U/ML

## 2024-12-04 LAB
ANA PATTERN: ABNORMAL
ANA SER QL HEP2 SUBST: POSITIVE
ANA TITR SER IF: ABNORMAL {TITER}
CENTROMERE B AB SER-ACNC: <0.2 AI
CHROMATIN AB SERPL-ACNC: <0.2 AI
DSDNA AB SER-ACNC: <1 IU/ML
ENA JO1 AB SER QL IA: <0.2 AI
ENA RNP AB SER IA-ACNC: 0.4 AI
ENA SCL70 AB SER QL IA: <0.2 AI
ENA SM AB SER IA-ACNC: <0.2 AI
ENA SM+RNP AB SER QL IA: <0.2 AI
ENA SS-A AB SER IA-ACNC: <0.2 AI
ENA SS-B AB SER IA-ACNC: <0.2 AI
RIBOSOMAL P AB SER-ACNC: <0.2 AI

## 2025-07-23 ENCOUNTER — APPOINTMENT (OUTPATIENT)
Dept: PRIMARY CARE | Facility: CLINIC | Age: 25
End: 2025-07-23
Payer: COMMERCIAL